# Patient Record
Sex: MALE | Race: WHITE
[De-identification: names, ages, dates, MRNs, and addresses within clinical notes are randomized per-mention and may not be internally consistent; named-entity substitution may affect disease eponyms.]

---

## 2018-09-02 ENCOUNTER — HOSPITAL ENCOUNTER (EMERGENCY)
Dept: HOSPITAL 72 - EMR | Age: 43
Discharge: HOME | End: 2018-09-02
Payer: COMMERCIAL

## 2018-09-02 VITALS — BODY MASS INDEX: 25.11 KG/M2 | HEIGHT: 67 IN | WEIGHT: 160 LBS

## 2018-09-02 VITALS — DIASTOLIC BLOOD PRESSURE: 98 MMHG | SYSTOLIC BLOOD PRESSURE: 152 MMHG

## 2018-09-02 VITALS — SYSTOLIC BLOOD PRESSURE: 152 MMHG | DIASTOLIC BLOOD PRESSURE: 98 MMHG

## 2018-09-02 DIAGNOSIS — I10: Primary | ICD-10-CM

## 2018-09-02 PROCEDURE — 99282 EMERGENCY DEPT VISIT SF MDM: CPT

## 2018-09-02 NOTE — EMERGENCY ROOM REPORT
History of Present Illness


General


Chief Complaint:  Hypertension


Source:  Patient





Present Illness


HPI


Patient is a 42-year-old male presented for high blood pressure.  Patient 

noticed recent elevations in his blood pressure.  Patient had recent eye exam 

where he had a blood pressure checked and was noted to have blood pressure 

systolic in the 170s.  The patient denies any chest pain shortness of breath 

visual changes or severe headache.  He denies any leg swelling or complaints 

this time.  The patient states he has primary care physician.


Allergies:  


Coded Allergies:  


     No Known Allergies (Unverified , 9/2/18)





Patient History


Past Medical History:  see triage record


Reviewed Nursing Documentation:  PMH: Agreed; PSxH: Agreed





Nursing Documentation-PMH


Past Medical History:  No Stated History





Review of Systems


All Other Systems:  negative except mentioned in HPI





Physical Exam





Vital Signs








  Date Time  Temp Pulse Resp B/P (MAP) Pulse Ox O2 Delivery O2 Flow Rate FiO2


 


9/2/18 07:56 98.2 87 18 152/98 97 Room Air  





 98.2       








General Appearance:  well appearing, no apparent distress, alert, GCS 15


Head:  normocephalic, atraumatic


ENT:  hearing grossly normal, normal voice


Neck:  full range of motion, supple


Respiratory:  lungs clear, no respiratory distress, speaking full sentences


Cardiovascular #1:  normal peripheral pulses, regular rate, rhythm, no edema


Gastrointestinal:  normal inspection, normal bowel sounds, non tender


Musculoskeletal:  normal inspection, no calf tenderness


Neurologic:  normal inspection, alert, oriented x3, normal gait


Psychiatric:  normal inspection, judgement/insight normal, mood/affect normal


Skin:  no rash





Medical Decision Making


Diagnostic Impression:  


 Primary Impression:  


 Hypertension


ER Course


Patient presented for asymptomatic hypertension.  Differential diagnosis 

included was not limited to the essential hypertension, hypertensive crisis, 

among others.  Patient has a benign exam and does not appear to require any 

further imaging or laboratory testing at this time. The patient was noted to 

have the mildly elevated blood pressure which does not require emergency 

department treatment at this time.  Patient stated that he had denied any 

current symptoms.  The patient was advised to follow-up with his primary care 

physician for reevaluation.  Patient was advised smoking cessation.  Patient 

was advised to return if he had any developed any concerning signs or symptoms.

  The patient is advised dietary modification to include increasing for initial 

intake and lower salt diet.





Last Vital Signs








  Date Time  Temp Pulse Resp B/P (MAP) Pulse Ox O2 Delivery O2 Flow Rate FiO2


 


9/2/18 07:56 98.2 87 18 152/98 97 Room Air  





 98.2       








Status:  improved


Disposition:  HOME, SELF-CARE


Condition:  Stable


Patient Instructions:  Hypertension





Additional Instructions:  


Follow up with your primary care physician for further management and 

reevaluation of your blood pressure.  Return if you have any concerns.











Noman Luciano MD Sep 2, 2018 08:19